# Patient Record
Sex: FEMALE | Race: OTHER | Employment: FULL TIME | URBAN - NONMETROPOLITAN AREA
[De-identification: names, ages, dates, MRNs, and addresses within clinical notes are randomized per-mention and may not be internally consistent; named-entity substitution may affect disease eponyms.]

---

## 2017-08-14 ENCOUNTER — OFFICE VISIT (OUTPATIENT)
Dept: PULMONOLOGY | Age: 32
End: 2017-08-14
Payer: COMMERCIAL

## 2017-08-14 VITALS
TEMPERATURE: 100 F | SYSTOLIC BLOOD PRESSURE: 114 MMHG | OXYGEN SATURATION: 97 % | WEIGHT: 260.4 LBS | DIASTOLIC BLOOD PRESSURE: 70 MMHG | BODY MASS INDEX: 49.17 KG/M2 | HEIGHT: 61 IN | HEART RATE: 94 BPM

## 2017-08-14 DIAGNOSIS — J45.40 MODERATE PERSISTENT ASTHMA WITHOUT COMPLICATION: ICD-10-CM

## 2017-08-14 DIAGNOSIS — J06.9 UPPER RESPIRATORY TRACT INFECTION, UNSPECIFIED TYPE: Primary | ICD-10-CM

## 2017-08-14 PROCEDURE — 99214 OFFICE O/P EST MOD 30 MIN: CPT | Performed by: INTERNAL MEDICINE

## 2017-08-14 RX ORDER — ALBUTEROL SULFATE 90 UG/1
2 AEROSOL, METERED RESPIRATORY (INHALATION) EVERY 6 HOURS PRN
Qty: 1 INHALER | Refills: 7 | Status: SHIPPED | OUTPATIENT
Start: 2017-08-14 | End: 2018-08-20 | Stop reason: ALTCHOICE

## 2017-08-14 RX ORDER — AZITHROMYCIN 250 MG/1
TABLET, FILM COATED ORAL
Qty: 1 PACKET | Refills: 0 | Status: SHIPPED | OUTPATIENT
Start: 2017-08-14 | End: 2017-08-24

## 2017-08-14 RX ORDER — ALBUTEROL SULFATE 2.5 MG/3ML
2.5 SOLUTION RESPIRATORY (INHALATION) EVERY 6 HOURS PRN
Qty: 120 EACH | Refills: 7 | Status: SHIPPED | OUTPATIENT
Start: 2017-08-14 | End: 2022-08-15 | Stop reason: SDUPTHER

## 2017-08-14 RX ORDER — BUDESONIDE AND FORMOTEROL FUMARATE DIHYDRATE 160; 4.5 UG/1; UG/1
2 AEROSOL RESPIRATORY (INHALATION) 2 TIMES DAILY
Qty: 1 INHALER | Refills: 11 | Status: SHIPPED | OUTPATIENT
Start: 2017-08-14 | End: 2018-08-15 | Stop reason: SDUPTHER

## 2017-08-14 RX ORDER — MONTELUKAST SODIUM 10 MG/1
10 TABLET ORAL NIGHTLY
Qty: 30 TABLET | Refills: 11 | Status: SHIPPED | OUTPATIENT
Start: 2017-08-14 | End: 2018-08-20 | Stop reason: SDUPTHER

## 2018-08-06 ENCOUNTER — HOSPITAL ENCOUNTER (OUTPATIENT)
Dept: PULMONOLOGY | Age: 33
Discharge: HOME | End: 2018-08-06
Payer: COMMERCIAL

## 2018-08-06 DIAGNOSIS — J45.40 MODERATE PERSISTENT ASTHMA WITHOUT COMPLICATION: ICD-10-CM

## 2018-08-06 DIAGNOSIS — J06.9 UPPER RESPIRATORY TRACT INFECTION, UNSPECIFIED TYPE: ICD-10-CM

## 2018-08-06 PROCEDURE — 94060 EVALUATION OF WHEEZING: CPT

## 2018-08-20 ENCOUNTER — OFFICE VISIT (OUTPATIENT)
Dept: PULMONOLOGY | Age: 33
End: 2018-08-20
Payer: COMMERCIAL

## 2018-08-20 VITALS
BODY MASS INDEX: 50.26 KG/M2 | HEART RATE: 80 BPM | WEIGHT: 266.2 LBS | TEMPERATURE: 98.6 F | HEIGHT: 61 IN | DIASTOLIC BLOOD PRESSURE: 60 MMHG | OXYGEN SATURATION: 96 % | SYSTOLIC BLOOD PRESSURE: 124 MMHG

## 2018-08-20 DIAGNOSIS — E66.01 MORBID OBESITY WITH BMI OF 50.0-59.9, ADULT (HCC): Primary | ICD-10-CM

## 2018-08-20 DIAGNOSIS — J45.40 MODERATE PERSISTENT ASTHMA WITHOUT COMPLICATION: ICD-10-CM

## 2018-08-20 DIAGNOSIS — F32.A DEPRESSION, UNSPECIFIED DEPRESSION TYPE: ICD-10-CM

## 2018-08-20 PROCEDURE — 99213 OFFICE O/P EST LOW 20 MIN: CPT | Performed by: NURSE PRACTITIONER

## 2018-08-20 RX ORDER — MONTELUKAST SODIUM 10 MG/1
10 TABLET ORAL NIGHTLY
Qty: 30 TABLET | Refills: 11 | Status: SHIPPED | OUTPATIENT
Start: 2018-08-20 | End: 2019-08-27 | Stop reason: SDUPTHER

## 2018-08-20 RX ORDER — ALBUTEROL SULFATE 90 UG/1
2 AEROSOL, METERED RESPIRATORY (INHALATION) EVERY 6 HOURS PRN
Qty: 1 INHALER | Refills: 11 | Status: SHIPPED | OUTPATIENT
Start: 2018-08-20 | End: 2020-04-13

## 2018-08-20 RX ORDER — ALBUTEROL SULFATE 90 UG/1
2 AEROSOL, METERED RESPIRATORY (INHALATION) EVERY 6 HOURS PRN
COMMUNITY
End: 2018-08-20 | Stop reason: SDUPTHER

## 2018-08-20 ASSESSMENT — ENCOUNTER SYMPTOMS
VOMITING: 0
NAUSEA: 0
WHEEZING: 0
DIARRHEA: 0
SHORTNESS OF BREATH: 0
EYES NEGATIVE: 1
HEMOPTYSIS: 0
COUGH: 0
ABDOMINAL PAIN: 0
SPUTUM PRODUCTION: 0

## 2019-08-12 DIAGNOSIS — J06.9 UPPER RESPIRATORY TRACT INFECTION, UNSPECIFIED TYPE: ICD-10-CM

## 2019-08-12 DIAGNOSIS — J45.40 MODERATE PERSISTENT ASTHMA WITHOUT COMPLICATION: ICD-10-CM

## 2019-08-13 RX ORDER — BUDESONIDE AND FORMOTEROL FUMARATE DIHYDRATE 160; 4.5 UG/1; UG/1
AEROSOL RESPIRATORY (INHALATION)
Qty: 10.2 G | Refills: 2 | Status: SHIPPED | OUTPATIENT
Start: 2019-08-13 | End: 2019-08-20 | Stop reason: ALTCHOICE

## 2019-08-20 ENCOUNTER — OFFICE VISIT (OUTPATIENT)
Dept: PULMONOLOGY | Age: 34
End: 2019-08-20
Payer: COMMERCIAL

## 2019-08-20 VITALS
HEART RATE: 84 BPM | WEIGHT: 250 LBS | BODY MASS INDEX: 47.2 KG/M2 | SYSTOLIC BLOOD PRESSURE: 126 MMHG | DIASTOLIC BLOOD PRESSURE: 70 MMHG | OXYGEN SATURATION: 97 % | HEIGHT: 61 IN | TEMPERATURE: 98.4 F

## 2019-08-20 DIAGNOSIS — E66.01 MORBID OBESITY WITH BMI OF 50.0-59.9, ADULT (HCC): ICD-10-CM

## 2019-08-20 PROCEDURE — 99213 OFFICE O/P EST LOW 20 MIN: CPT | Performed by: NURSE PRACTITIONER

## 2019-08-20 ASSESSMENT — ENCOUNTER SYMPTOMS
COUGH: 0
DIARRHEA: 0
SHORTNESS OF BREATH: 0
EYES NEGATIVE: 1
NAUSEA: 0
WHEEZING: 0
VOMITING: 0
ABDOMINAL PAIN: 0

## 2019-08-27 ENCOUNTER — TELEPHONE (OUTPATIENT)
Dept: PULMONOLOGY | Age: 34
End: 2019-08-27

## 2019-08-27 DIAGNOSIS — J45.40 MODERATE PERSISTENT ASTHMA WITHOUT COMPLICATION: ICD-10-CM

## 2019-08-27 RX ORDER — MONTELUKAST SODIUM 10 MG/1
10 TABLET ORAL NIGHTLY
Qty: 30 TABLET | Refills: 11 | Status: SHIPPED | OUTPATIENT
Start: 2019-08-27 | End: 2020-10-01 | Stop reason: SDUPTHER

## 2019-12-09 ENCOUNTER — TELEPHONE (OUTPATIENT)
Dept: PULMONOLOGY | Age: 34
End: 2019-12-09

## 2020-01-14 ENCOUNTER — OFFICE VISIT (OUTPATIENT)
Dept: PULMONOLOGY | Age: 35
End: 2020-01-14
Payer: COMMERCIAL

## 2020-01-14 VITALS
OXYGEN SATURATION: 98 % | TEMPERATURE: 98.2 F | DIASTOLIC BLOOD PRESSURE: 78 MMHG | HEIGHT: 61 IN | BODY MASS INDEX: 48.52 KG/M2 | HEART RATE: 106 BPM | WEIGHT: 257 LBS | SYSTOLIC BLOOD PRESSURE: 124 MMHG

## 2020-01-14 PROCEDURE — 95012 NITRIC OXIDE EXP GAS DETER: CPT | Performed by: NURSE PRACTITIONER

## 2020-01-14 PROCEDURE — 99213 OFFICE O/P EST LOW 20 MIN: CPT | Performed by: NURSE PRACTITIONER

## 2020-01-14 ASSESSMENT — ENCOUNTER SYMPTOMS
EYES NEGATIVE: 1
CHEST TIGHTNESS: 0
WHEEZING: 0
NAUSEA: 0
SHORTNESS OF BREATH: 0
ALLERGIC/IMMUNOLOGIC NEGATIVE: 1
COUGH: 0
DIARRHEA: 0
STRIDOR: 0
BACK PAIN: 0

## 2020-01-14 NOTE — PROGRESS NOTES
[facility]     Patient: Rebekah Smith, 30 y.o.   : 1985  2020    Pt of Dr. Justin Hightower   Patient presents with    Follow-up     Asthma 3 month follow up with no testing        HPI  Marissa is here for 3 month med follow up for her asthma   Last visit we de-escalated her therapy from ICS/ LABA to single ICS. Insurance would not cover Pulmicort so she is taking Qvar 80 mcg   Has little cough and nasal congestion x 2 weeks, denies any wheezing, chest tightness, increased SOB or fevers  Not taking any OTC meds for her cold. Has lost about 10 pds in past 1 year   Past Medical hx   PMH:  Past Medical History:   Diagnosis Date    Asthma     Depression     Difficult intubation 2016    with [facility]    Thyroid disease      SURGICAL HISTORY:  Past Surgical History:   Procedure Laterality Date     SECTION  2016    IGS ENDO SINUS SX Bilateral 10/04/2016    maxillary sinusotomy, bilateral ethmoidectomy, sptoplasty, bilateral turbinoplasties--Dr Go    TONSILLECTOMY       SOCIAL HISTORY:  Social History     Tobacco Use    Smoking status: Never Smoker    Smokeless tobacco: Never Used   Substance Use Topics    Alcohol use: No     Alcohol/week: 0.0 standard drinks    Drug use: No     ALLERGIES:  Allergies   Allergen Reactions    Clindamycin/Lincomycin Hives     FAMILY HISTORY:No family history on file.   CURRENT MEDICATIONS:  Current Outpatient Medications   Medication Sig Dispense Refill    beclomethasone (QVAR REDIHALER) 80 MCG/ACT AERB inhaler Inhale 2 puffs into the lungs 2 times daily 1 Inhaler 11    montelukast (SINGULAIR) 10 MG tablet Take 1 tablet by mouth nightly 30 tablet 11    albuterol sulfate HFA (PROAIR HFA) 108 (90 Base) MCG/ACT inhaler Inhale 2 puffs into the lungs every 6 hours as needed for Wheezing 1 Inhaler 11    albuterol (PROVENTIL) (2.5 MG/3ML) 0.083% nebulizer solution Take 3 mLs by nebulization every 6 hours as needed for Wheezing 120 each 7    sertraline (ZOLOFT) 25 MG tablet Take 100 mg by mouth daily        No current facility-administered medications for this visit. Mateo MERCADO   Review of Systems   Constitutional: Negative for activity change, appetite change, chills and fever. HENT: Negative for congestion and postnasal drip. Eyes: Negative. Respiratory: Negative for cough, chest tightness, shortness of breath, wheezing and stridor. Cardiovascular: Negative for chest pain and leg swelling. Gastrointestinal: Negative for diarrhea and nausea. Endocrine: Negative. Genitourinary: Negative. Musculoskeletal: Negative. Negative for arthralgias and back pain. Skin: Negative. Allergic/Immunologic: Negative. Neurological: Negative. Negative for dizziness and light-headedness. Psychiatric/Behavioral: Negative. All other systems reviewed and are negative. Physical exam   /78 (Site: Right Upper Arm, Position: Sitting, Cuff Size: Large Adult)   Pulse 106   Temp 98.2 °F (36.8 °C)   Ht 5' 1\" (1.549 m)   Wt 257 lb (116.6 kg)   SpO2 98% Comment: on room air at rest  BMI 48.56 kg/m²        Physical Exam  Vitals signs and nursing note reviewed. Constitutional:       General: She is not in acute distress. Appearance: She is well-developed. She is obese. HENT:      Mouth/Throat:      Mouth: Mucous membranes are moist.      Pharynx: Oropharynx is clear. No oropharyngeal exudate. Eyes:      General: No scleral icterus. Right eye: No discharge. Conjunctiva/sclera: Conjunctivae normal.   Neck:      Musculoskeletal: Neck supple. Vascular: No JVD. Cardiovascular:      Rate and Rhythm: Normal rate and regular rhythm. Heart sounds: No murmur. No friction rub. Pulmonary:      Effort: Pulmonary effort is normal. No respiratory distress. Breath sounds: Normal breath sounds. No wheezing or rales.

## 2020-04-13 RX ORDER — ALBUTEROL SULFATE 90 UG/1
AEROSOL, METERED RESPIRATORY (INHALATION)
Qty: 25.5 G | Refills: 4 | Status: SHIPPED | OUTPATIENT
Start: 2020-04-13 | End: 2020-10-07 | Stop reason: SDUPTHER

## 2020-09-29 NOTE — TELEPHONE ENCOUNTER
CLINICAL PHARMACY CONSULT: ASTHMA INHALER REVIEW    Natali Constantino is a 37 y.o. female Identified as an Asthma care gap for [place]: high medication possession ratio of rescue to maintenance inhalers. Per [employer] claims Asthma medication ratio 0.45    Per [facility], montelukast script on file is . No active refills remaining. Attempt made to contact pt. Unable to leave message as voicemail box full. Will continue to attempt to contact pt as appropriate.     Jolly Sanchez, PharmD, [address], [facility]  [facility] Pharmacist  O: [phone]  Department, toll free: [phone], option 7

## 2020-09-30 NOTE — TELEPHONE ENCOUNTER
Li Soto CNP,    Per [facility] pharmacy, [facility] script on file is . No active refills remaining. I have pended prescription refill for your convenience if you agree. Thank you,  Katelin Junior, PharmD, [place], [address] [place] [address] Road: [phone]  Department, toll free: [phone], option 7   =======================================================    Second attempt made to contact pt. Unable to leave message as voicemail box full. MyChart message sent.

## 2020-10-01 RX ORDER — MONTELUKAST SODIUM 10 MG/1
10 TABLET ORAL NIGHTLY
Qty: 30 TABLET | Refills: 11 | Status: SHIPPED | OUTPATIENT
Start: 2020-10-01 | End: 2021-11-02 | Stop reason: SDUPTHER

## 2020-10-01 NOTE — TELEPHONE ENCOUNTER
Thank you, note refill sent!     CLINICAL PHARMACY CONSULT: MED RECONCILIATION/REVIEW ADDENDUM  For Pharmacy Admin Tracking Only  PHSO: Yes  Total # of Interventions Recommended: 1  - Refills Provided #: 1  - Maintenance Safety Lab Monitoring #: 1  Recommended intervention potential cost savings: 0  Total Interventions Accepted: 1  Time Spent (min): Adore Hummel 930, PharmD  [address]

## 2020-10-07 RX ORDER — ALBUTEROL SULFATE 90 UG/1
AEROSOL, METERED RESPIRATORY (INHALATION)
Qty: 25.5 G | Refills: 4 | Status: SHIPPED | OUTPATIENT
Start: 2020-10-07 | End: 2021-04-02 | Stop reason: SDUPTHER

## 2020-10-07 NOTE — TELEPHONE ENCOUNTER
[facility] called requesting a refill on the following medications:  Requested Prescriptions     Pending Prescriptions Disp Refills    albuterol sulfate  (90 Base) MCG/ACT inhaler 25.5 g 4     Pharmacy verified: [facility]  .       Date of last visit: 1/14/2020  Date of next visit (if applicable): Visit date not found    Pharmacy needs a new prescription as she switched pharmacies

## 2020-12-29 ENCOUNTER — TELEPHONE (OUTPATIENT)
Dept: PULMONOLOGY | Age: 35
End: 2020-12-29

## 2021-04-02 RX ORDER — ALBUTEROL SULFATE 90 UG/1
AEROSOL, METERED RESPIRATORY (INHALATION)
Qty: 25.5 G | Refills: 4 | Status: SHIPPED | OUTPATIENT
Start: 2021-04-02 | End: 2022-04-19

## 2021-04-02 NOTE — TELEPHONE ENCOUNTER
[facility] called requesting a refill on the following medications:  Requested Prescriptions     Pending Prescriptions Disp Refills    albuterol sulfate  (90 Base) MCG/ACT inhaler 25.5 g 4     Sig: USE 2 INHALATIONS EVERY 6  HOURS AS NEEDED FOR        [address] verified: [facility], [place]    Date of last visit: 1/14/20  Date of next visit (if applicable): 7/60/8210

## 2021-04-28 ENCOUNTER — HOSPITAL ENCOUNTER (OUTPATIENT)
Age: 36
Discharge: HOME | End: 2021-04-28
Payer: COMMERCIAL

## 2021-04-28 PROCEDURE — U0005 INFEC AGEN DETEC AMPLI PROBE: HCPCS

## 2021-04-28 PROCEDURE — U0003 INFECTIOUS AGENT DETECTION BY NUCLEIC ACID (DNA OR RNA); SEVERE ACUTE RESPIRATORY SYNDROME CORONAVIRUS 2 (SARS-COV-2) (CORONAVIRUS DISEASE [COVID-19]), AMPLIFIED PROBE TECHNIQUE, MAKING USE OF HIGH THROUGHPUT TECHNOLOGIES AS DESCRIBED BY CMS-2020-01-R: HCPCS

## 2021-04-30 ENCOUNTER — TELEPHONE (OUTPATIENT)
Dept: PULMONOLOGY | Age: 36
End: 2021-04-30

## 2021-04-30 DIAGNOSIS — J45.40 MODERATE PERSISTENT ASTHMA WITHOUT COMPLICATION: Primary | ICD-10-CM

## 2021-04-30 LAB
SARS-COV-2: NOT DETECTED
SOURCE: NORMAL

## 2021-05-03 ENCOUNTER — HOSPITAL ENCOUNTER (OUTPATIENT)
Dept: PULMONOLOGY | Age: 36
Discharge: HOME | End: 2021-05-03
Payer: COMMERCIAL

## 2021-05-03 ENCOUNTER — TELEPHONE (OUTPATIENT)
Dept: PULMONOLOGY | Age: 36
End: 2021-05-03

## 2021-05-03 DIAGNOSIS — J45.40 MODERATE PERSISTENT ASTHMA WITHOUT COMPLICATION: Primary | ICD-10-CM

## 2021-05-03 DIAGNOSIS — J45.40 MODERATE PERSISTENT ASTHMA WITHOUT COMPLICATION: ICD-10-CM

## 2021-05-03 PROCEDURE — 94726 PLETHYSMOGRAPHY LUNG VOLUMES: CPT

## 2021-05-03 PROCEDURE — 94729 DIFFUSING CAPACITY: CPT

## 2021-05-03 PROCEDURE — 94060 EVALUATION OF WHEEZING: CPT

## 2021-05-20 ENCOUNTER — OFFICE VISIT (OUTPATIENT)
Dept: PULMONOLOGY | Age: 36
End: 2021-05-20
Payer: COMMERCIAL

## 2021-05-20 VITALS
TEMPERATURE: 97.8 F | BODY MASS INDEX: 51.54 KG/M2 | OXYGEN SATURATION: 98 % | DIASTOLIC BLOOD PRESSURE: 78 MMHG | WEIGHT: 273 LBS | HEIGHT: 61 IN | SYSTOLIC BLOOD PRESSURE: 122 MMHG | HEART RATE: 90 BPM

## 2021-05-20 DIAGNOSIS — Z91.09 ENVIRONMENTAL ALLERGIES: ICD-10-CM

## 2021-05-20 DIAGNOSIS — J45.40 MODERATE PERSISTENT ASTHMA WITHOUT COMPLICATION: Primary | ICD-10-CM

## 2021-05-20 DIAGNOSIS — E66.01 MORBID OBESITY WITH BMI OF 50.0-59.9, ADULT (HCC): ICD-10-CM

## 2021-05-20 PROCEDURE — 99214 OFFICE O/P EST MOD 30 MIN: CPT | Performed by: NURSE PRACTITIONER

## 2021-05-20 PROCEDURE — 1036F TOBACCO NON-USER: CPT | Performed by: NURSE PRACTITIONER

## 2021-05-20 PROCEDURE — G8427 DOCREV CUR MEDS BY ELIG CLIN: HCPCS | Performed by: NURSE PRACTITIONER

## 2021-05-20 PROCEDURE — G8417 CALC BMI ABV UP PARAM F/U: HCPCS | Performed by: NURSE PRACTITIONER

## 2021-05-20 RX ORDER — FLUTICASONE PROPIONATE 110 UG/1
2 AEROSOL, METERED RESPIRATORY (INHALATION) 2 TIMES DAILY
Qty: 1 INHALER | Refills: 11 | Status: SHIPPED | OUTPATIENT
Start: 2021-05-20 | End: 2022-05-09

## 2021-05-20 ASSESSMENT — ENCOUNTER SYMPTOMS
SORE THROAT: 0
NAUSEA: 0
RHINORRHEA: 1
WHEEZING: 0
EYE DISCHARGE: 1
SHORTNESS OF BREATH: 0
CHEST TIGHTNESS: 1
COUGH: 1
DIARRHEA: 0
VOMITING: 0
ABDOMINAL PAIN: 0

## 2021-05-20 NOTE — PROGRESS NOTES
[place] [facility]     Patient: Sergey Guerra, 31 y.o.   : 1985  2021    Pt of Dr. Suhail Perez   Patient presents with    Follow-up     Asthma 16 month pulmonary  follow up with PFT 5/3/2021        HPI  Priyanka Hernandez is here for 1year follow up for Asthma    Exercising, \"power walking about 1 mile a day\" trying to loose weight  Cough that started 1 month ago due to allergies  Takes OTC Zyrtec and Singulair as prescribed with benefit  No ER visits  No exacerbations  Denies nocturnal symptoms  Using Qvar as instructed, but reports nausea/ gagging with every use and sometimes vomits from the gagging. States the current inhaler is a resplick device, has never had trouble with sprays , and has no trouble with albuterol   Is seldomly requiring albuterol   No wheezing       SOCIAL HISTORY:  Social History     Tobacco Use    Smoking status: Never Smoker    Smokeless tobacco: Never Used   Substance Use Topics    Alcohol use: No     Alcohol/week: 0.0 standard drinks    Drug use: No         CURRENT MEDICATIONS:  Current Outpatient Medications   Medication Sig Dispense Refill    fluticasone (FLOVENT HFA) 110 MCG/ACT inhaler Inhale 2 puffs into the lungs 2 times daily Rinse mouth after its use. Use with spacer 1 Inhaler 11    albuterol sulfate  (90 Base) MCG/ACT inhaler USE 2 INHALATIONS EVERY 6  HOURS AS NEEDED FOR        SHORTNESS OF BREATH 25.5 g 4    montelukast (SINGULAIR) 10 MG tablet Take 1 tablet by mouth nightly 30 tablet 11    albuterol (PROVENTIL) (2.5 MG/3ML) 0.083% nebulizer solution Take 3 mLs by nebulization every 6 hours as needed for Wheezing 120 each 7    sertraline (ZOLOFT) 25 MG tablet Take 100 mg by mouth daily        No current facility-administered medications for this visit. [facility] Records ROS   Review of Systems   Constitutional: Negative for chills and fever. HENT: Positive for congestion, postnasal drip and rhinorrhea. Nails: There is no clubbing. Neurological:      Mental Status: She is alert. Psychiatric:         Mood and Affect: Mood normal.         Behavior: Behavior normal.         Thought Content: Thought content normal.         Judgment: Judgment normal.          Test results   Lung Nodule Screening     [] Qualifies    [x]Does not qualify   [] Declined    [] Completed           Assessment      Diagnosis Orders   1. Moderate persistent asthma without complication  fluticasone (FLOVENT HFA) 110 MCG/ACT inhaler   2. Environmental allergies     3. Morbid obesity with BMI of 50.0-59.9, adult (HCC)       Plan    Stable PFT, does show mild air trapping of unclear significance, will continue to monitor   Change Qvar to FLovent HFA , use with spacer.  Continue to rinse well after use  Counseled on weight loss  Continue Singulair daily   PRN Zyrtec     Billed based on medical decision making   Will see Nadira Carrasquillo in: 1 year    Electronically signed by YANG Puente CNP on 5/20/2021 at 10:23 AM

## 2021-11-02 DIAGNOSIS — J45.40 MODERATE PERSISTENT ASTHMA WITHOUT COMPLICATION: ICD-10-CM

## 2021-11-02 RX ORDER — MONTELUKAST SODIUM 10 MG/1
10 TABLET ORAL NIGHTLY
Qty: 30 TABLET | Refills: 11 | Status: SHIPPED | OUTPATIENT
Start: 2021-11-02 | End: 2022-05-23 | Stop reason: SDUPTHER

## 2022-04-19 RX ORDER — ALBUTEROL SULFATE 90 UG/1
AEROSOL, METERED RESPIRATORY (INHALATION)
Qty: 25.5 G | Refills: 4 | OUTPATIENT
Start: 2022-04-19

## 2022-04-19 NOTE — TELEPHONE ENCOUNTER
[facility] called requesting a refill on the following medications:  Requested Prescriptions     Pending Prescriptions Disp Refills    albuterol sulfate  (90 Base) MCG/ACT inhaler 25.5 g 4     Sig: USE 2 INHALATIONS EVERY 6  HOURS AS NEEDED FOR        [address] verified: [employer] in [facility]  .       Date of last visit: 5/20/2021  Date of next visit (if applicable): 3/51/9491

## 2022-05-09 DIAGNOSIS — J45.40 MODERATE PERSISTENT ASTHMA WITHOUT COMPLICATION: ICD-10-CM

## 2022-05-09 RX ORDER — DEXAMETHASONE 4 MG/1
TABLET ORAL
Qty: 12 G | Refills: 0 | Status: SHIPPED | OUTPATIENT
Start: 2022-05-09 | End: 2022-08-15 | Stop reason: SDUPTHER

## 2022-05-09 NOTE — TELEPHONE ENCOUNTER
Received refill request for Flovent . Medication was last ordered by Marie. Medication was last ordered on 5/20/2021 with 11 refills. Patient was last seen in the office 5/20/2021. Patient has a scheduled follow up 5/23/2022.

## 2022-05-20 ENCOUNTER — TELEPHONE (OUTPATIENT)
Dept: PULMONOLOGY | Age: 37
End: 2022-05-20

## 2022-05-20 DIAGNOSIS — J45.40 MODERATE PERSISTENT ASTHMA WITHOUT COMPLICATION: ICD-10-CM

## 2022-05-20 NOTE — TELEPHONE ENCOUNTER
Called pt to move appt out due to cary being out she stated she needed refills on the following Received refill request for Proair. Medication was last ordered by Luther Drew. Medication was last ordered on 4/19/22 with 1 refills. Patient was last seen in the office 5/20/21. Patient has a scheduled follow up 8/2/22. Medication needs to be sent to [facility].    And Singular last refilled 11/2/21

## 2022-05-23 RX ORDER — MONTELUKAST SODIUM 10 MG/1
10 TABLET ORAL NIGHTLY
Qty: 30 TABLET | Refills: 11 | Status: SHIPPED | OUTPATIENT
Start: 2022-05-23

## 2022-08-01 DIAGNOSIS — J45.40 MODERATE PERSISTENT ASTHMA WITHOUT COMPLICATION: ICD-10-CM

## 2022-08-01 DIAGNOSIS — J06.9 UPPER RESPIRATORY TRACT INFECTION, UNSPECIFIED TYPE: ICD-10-CM

## 2022-08-01 NOTE — TELEPHONE ENCOUNTER
[facility] called requesting a refill on the following medications:  Requested Prescriptions     Pending Prescriptions Disp Refills    fluticasone (FLOVENT HFA) 110 MCG/ACT inhaler 12 g 0    albuterol (PROVENTIL) (2.5 MG/3ML) 0.083% nebulizer solution 120 each 7     Sig: Take 3 mLs by nebulization every 6 hours as needed for Wheezing     Pharmacy verified: [facility] in [facility]  . pv      Date of last visit: 5/20/2021  Date of next visit (if applicable): Visit date not found

## 2022-08-02 NOTE — TELEPHONE ENCOUNTER
Received refill request for Flovent / Proventil. Medication was last ordered by Marianne Borges. Medication was last ordered on 5/9/22, 8/14/17. Patient was last seen in the office 5/20/21, canceled 5/23/22, and 8/2/22 notes that pt tested + for COVID. Medication needs to be sent to [facility].

## 2022-08-03 RX ORDER — ALBUTEROL SULFATE 2.5 MG/3ML
2.5 SOLUTION RESPIRATORY (INHALATION) EVERY 6 HOURS PRN
Qty: 120 EACH | Refills: 7 | OUTPATIENT
Start: 2022-08-03

## 2022-08-03 RX ORDER — FLUTICASONE PROPIONATE 110 UG/1
AEROSOL, METERED RESPIRATORY (INHALATION)
Qty: 12 G | Refills: 0 | OUTPATIENT
Start: 2022-08-03

## 2022-08-12 NOTE — TELEPHONE ENCOUNTER
Patient calling in to check on this, she does have an appt scheduled for 10/17/2022. Please call her back to discuss, and if needs seen sooner please advise when.

## 2022-08-15 RX ORDER — ALBUTEROL SULFATE 2.5 MG/3ML
2.5 SOLUTION RESPIRATORY (INHALATION) EVERY 6 HOURS PRN
Qty: 120 EACH | Refills: 2 | Status: SHIPPED | OUTPATIENT
Start: 2022-08-15

## 2022-08-15 RX ORDER — FLUTICASONE PROPIONATE 110 UG/1
AEROSOL, METERED RESPIRATORY (INHALATION)
Qty: 12 G | Refills: 2 | Status: SHIPPED | OUTPATIENT
Start: 2022-08-15 | End: 2022-10-04 | Stop reason: SDUPTHER

## 2022-08-15 NOTE — TELEPHONE ENCOUNTER
I have approved for 3 months of medication. She has cancelled 2 appts now back to back and has not been seen in over 1 year. She must keep scheduled appt in oct , or will need to get refills from PCP until seen .

## 2022-10-04 ENCOUNTER — OFFICE VISIT (OUTPATIENT)
Dept: PULMONOLOGY | Age: 37
End: 2022-10-04
Payer: COMMERCIAL

## 2022-10-04 VITALS
WEIGHT: 276.2 LBS | HEIGHT: 63 IN | OXYGEN SATURATION: 99 % | BODY MASS INDEX: 48.94 KG/M2 | DIASTOLIC BLOOD PRESSURE: 80 MMHG | HEART RATE: 81 BPM | SYSTOLIC BLOOD PRESSURE: 130 MMHG | TEMPERATURE: 98.1 F

## 2022-10-04 DIAGNOSIS — J45.40 MODERATE PERSISTENT ASTHMA WITHOUT COMPLICATION: ICD-10-CM

## 2022-10-04 DIAGNOSIS — J45.41 MODERATE PERSISTENT ASTHMA WITH EXACERBATION: Primary | ICD-10-CM

## 2022-10-04 DIAGNOSIS — Z86.16 HISTORY OF 2019 NOVEL CORONAVIRUS DISEASE (COVID-19): ICD-10-CM

## 2022-10-04 DIAGNOSIS — E66.01 MORBID OBESITY WITH BMI OF 50.0-59.9, ADULT (HCC): ICD-10-CM

## 2022-10-04 PROCEDURE — 1036F TOBACCO NON-USER: CPT | Performed by: NURSE PRACTITIONER

## 2022-10-04 PROCEDURE — G8427 DOCREV CUR MEDS BY ELIG CLIN: HCPCS | Performed by: NURSE PRACTITIONER

## 2022-10-04 PROCEDURE — G8484 FLU IMMUNIZE NO ADMIN: HCPCS | Performed by: NURSE PRACTITIONER

## 2022-10-04 PROCEDURE — G8417 CALC BMI ABV UP PARAM F/U: HCPCS | Performed by: NURSE PRACTITIONER

## 2022-10-04 PROCEDURE — 99214 OFFICE O/P EST MOD 30 MIN: CPT | Performed by: NURSE PRACTITIONER

## 2022-10-04 RX ORDER — FLUTICASONE PROPIONATE 110 UG/1
AEROSOL, METERED RESPIRATORY (INHALATION)
Qty: 12 G | Refills: 2 | Status: SHIPPED | OUTPATIENT
Start: 2022-10-04

## 2022-10-04 RX ORDER — PREDNISONE 20 MG/1
40 TABLET ORAL DAILY
Qty: 10 TABLET | Refills: 0 | Status: SHIPPED | OUTPATIENT
Start: 2022-10-04 | End: 2022-10-09

## 2022-10-04 ASSESSMENT — ENCOUNTER SYMPTOMS
DIARRHEA: 0
ABDOMINAL PAIN: 0
COUGH: 1
SHORTNESS OF BREATH: 0
WHEEZING: 1
EYES NEGATIVE: 1
NAUSEA: 0
VOMITING: 0

## 2022-10-04 NOTE — PROGRESS NOTES
[facility]     Patient: Tyra Orozco, 30 y.o.   : 1985  10/4/2022    Pt of Dr. Kyela Bowman   Patient presents with    Follow-up     1 year asthma follow up, no testing. HPI  Nikhil Vargas is here for 1 year follow up for Asthma    Current symptoms:   Patient is experiencing SOB: \"no\" but chest tightness with breathing since covid 2022  Patient is experiencing wheezing: No  Patient states they have had a productive cough. Phlegm is daily, in morning, light green in color, new since COVID . Patient is coughing up blood: no  Patient has been experiencing chest pains: non-existent  Patient is currently taking the following inhaler(s): Flovent and Proair, Singulair PO nightly  PRN Zyrtec OTC - not currently taking   Patient is currently taking the following nebulizer treatment(s): Albuterol used 2022 when pt had covid- mild symptoms; was prescribed Paxlovid   Patient is using their rescue inhaler \"every 4 hours or so since covid\" gets benefit with use of albuterol   Patient needs refills of the following medications: Proair and Flovent     All refills should be sent to [facility] in [facility]  Other: family history, pt's mother passed away.      Last spirometry: - normal spirometry, + air trapping     SOCIAL HISTORY:  Social History     Tobacco Use    Smoking status: Never    Smokeless tobacco: Never   Substance Use Topics    Alcohol use: No     Alcohol/week: 0.0 standard drinks    Drug use: No         CURRENT MEDICATIONS:  Current Outpatient Medications   Medication Sig Dispense Refill    predniSONE (DELTASONE) 20 MG tablet Take 2 tablets by mouth daily for 5 days 10 tablet 0    fluticasone (FLOVENT HFA) 110 MCG/ACT inhaler inhale 2 puffs by mouth twice a day and INTO THE LUNGS Rinse mouth after use ---USE WITH SPACER 12 g 2    albuterol (PROVENTIL) (2.5 MG/3ML) 0.083% nebulizer solution Take 3 mLs by nebulization every 6 hours as needed for Wheezing 120 each 2    montelukast (SINGULAIR) 10 MG tablet Take 1 tablet by mouth nightly 30 tablet 11    PROAIR  (90 Base) MCG/ACT inhaler inhale 2 puffs by mouth every 6 hours if needed for shortness of breath 25.5 g 1    sertraline (ZOLOFT) 25 MG tablet Take 100 mg by mouth daily        No current facility-administered medications for this visit. Yulia MERCADO   Review of Systems   Constitutional:  Negative for activity change, appetite change, chills, fatigue, fever and unexpected weight change. HENT: Negative. Eyes: Negative. Respiratory:  Positive for cough and wheezing. Negative for shortness of breath. Cardiovascular:  Negative for chest pain, palpitations and leg swelling. Gastrointestinal:  Negative for abdominal pain, diarrhea, nausea and vomiting. Genitourinary: Negative. Musculoskeletal: Negative. Skin: Negative. Neurological: Negative. Hematological: Negative. Psychiatric/Behavioral: Negative. Physical exam   /80 (Site: Left Upper Arm, Position: Sitting, Cuff Size: Medium Adult)   Pulse 81   Temp 98.1 °F (36.7 °C) (Oral)   Ht 5' 3\" (1.6 m)   Wt 276 lb 3.2 oz (125.3 kg)   SpO2 99%   BMI 48.93 kg/m²       Wt Readings from Last 3 Encounters:   10/04/22 276 lb 3.2 oz (125.3 kg)   05/20/21 273 lb (123.8 kg)   01/14/20 257 lb (116.6 kg)     Physical Exam  Vitals and nursing note reviewed. Constitutional:       General: She is not in acute distress. Appearance: She is well-developed and overweight. HENT:      Nose: No congestion or rhinorrhea. Mouth/Throat:      Lips: Pink. Mouth: Mucous membranes are moist.      Pharynx: Oropharynx is clear. No oropharyngeal exudate or posterior oropharyngeal erythema. Eyes:      Conjunctiva/sclera: Conjunctivae normal.   Neck:      Vascular: No JVD. Cardiovascular:      Rate and Rhythm: Normal rate and regular rhythm. Heart sounds: No murmur heard. No friction rub.    Pulmonary: Effort: Pulmonary effort is normal. No accessory muscle usage or respiratory distress. Breath sounds: Examination of the right-upper field reveals wheezing. Examination of the right-middle field reveals wheezing. Wheezing present. No rhonchi or rales. Chest:      Chest wall: No tenderness. Musculoskeletal:      Right lower leg: No edema. Left lower leg: No edema. Skin:     General: Skin is warm and dry. Capillary Refill: Capillary refill takes less than 2 seconds. Nails: There is no clubbing. Neurological:      Mental Status: She is alert and oriented to person, place, and time. Psychiatric:         Mood and Affect: Mood normal.         Behavior: Behavior normal.         Thought Content: Thought content normal.         Judgment: Judgment normal.        Test results   Lung Nodule Screening     [] Qualifies    [x]Does not qualify   [] Declined    [] Completed    7330     Assessment       ICD-10-CM    1. Moderate persistent asthma with exacerbation  J45.41       2. Moderate persistent asthma without complication  A41.40 fluticasone (FLOVENT HFA) 110 MCG/ACT inhaler      3. Morbid obesity with BMI of 50.0-59.9, adult [facility]  E66.01     Z68.43       4. History of 2019 novel coronavirus disease (COVID-19)  Z86.16           Current asthma exacerbation due to recent COVID 19 infection. Plan    -Asthma symptoms have been very well controlled until recent COVID-19 infection, now having persistent chest tightness and wheezing distant with exacerbation;    Rx for prednisone 40 mg p.o. daily x5 days  Continue current dosing of Flovent 100 mcg inhaler 2 puffs twice daily, rinse mouth well after use  Continue to utilize as needed albuterol sulfate  Continue Singulair 10 mg tablet p.o. nightly  As needed over-the-counter antihistamine for allergy symptoms  -avoid ill contacts  -Can hold on obesity and recommendation for weight reduction.  -keep UTD on recommended vaccinations    (Please note that portions of this note may have been with a voice recognition program.  Efforts were made to edit the dictation but occasionally words are mis-transcribed )     Call office if no improvement in symptoms    Will see Sal Hinton in: 1 year  billing based on medical decision making   Electronically signed by YANG Stauffer CNP on 10/4/2022 at 10:36 AM

## 2022-10-04 NOTE — PROGRESS NOTES
Patient is experiencing SOB: \"no\" but chest tightness with breathing since covid 8/2022    Patient is experiencing wheezing: No    Patient states they have had a Weak, productive = 2 cough. Phlegm is daily, in morning, light green in color. Patient is coughing up blood: no    Patient has been experiencing chest pains: non-existent    Patient is currently taking the following inhaler(s): Flovent and Proair    Patient is currently taking the following nebulizer treatment(s): Albuterol used 8/2022 when pt had covid    Patient is using their rescue inhaler \"every 4 hours or so since covid\"    Patient needs refills of the following medications: Proair and Flovent    All refills should be sent to [employer] in [facility]    Other: family history, pt's mother passed away.

## 2022-10-12 ENCOUNTER — TELEPHONE (OUTPATIENT)
Dept: PULMONOLOGY | Age: 37
End: 2022-10-12

## 2022-10-12 DIAGNOSIS — J45.41 MODERATE PERSISTENT ASTHMA WITH EXACERBATION: Primary | ICD-10-CM

## 2022-10-13 RX ORDER — AZITHROMYCIN 250 MG/1
250 TABLET, FILM COATED ORAL SEE ADMIN INSTRUCTIONS
Qty: 6 TABLET | Refills: 0 | Status: SHIPPED | OUTPATIENT
Start: 2022-10-13 | End: 2022-10-18

## 2022-10-13 RX ORDER — PREDNISONE 10 MG/1
TABLET ORAL
Qty: 30 TABLET | Refills: 0 | Status: SHIPPED | OUTPATIENT
Start: 2022-10-13 | End: 2022-10-23

## 2022-10-20 NOTE — TELEPHONE ENCOUNTER
Received refill request for Albuterol. Medication was last ordered by cary. Medication was last ordered on 5/23/22 with 1 refills. Patient was last seen in the office 10/4/22. Patient has a scheduled follow up 10/9/23. Medication needs to be sent to [address] [place] [address] -  [phone] Pharmacy.

## 2022-10-31 ENCOUNTER — TELEPHONE (OUTPATIENT)
Dept: PULMONOLOGY | Age: 37
End: 2022-10-31

## 2022-10-31 NOTE — TELEPHONE ENCOUNTER
RA Ada needs permission to give patient generic Proair, brand name Proair isnt made anymore. Please call RA Ada back with response.

## 2022-11-01 NOTE — TELEPHONE ENCOUNTER
Patient is calling to check on this request. They are having trouble getting the proair in from their supplier. They are wanting to switch it to an alternative and she is currently out of her inhaler.     Elia Gil #[id number] - [place], [facility] -  [phone]

## 2022-12-06 DIAGNOSIS — J45.40 MODERATE PERSISTENT ASTHMA WITHOUT COMPLICATION: ICD-10-CM

## 2022-12-06 RX ORDER — FLUTICASONE PROPIONATE 110 UG/1
AEROSOL, METERED RESPIRATORY (INHALATION)
Qty: 12 G | Refills: 9 | Status: SHIPPED | OUTPATIENT
Start: 2022-12-06

## 2022-12-06 NOTE — TELEPHONE ENCOUNTER
Received refill request for Flovent. Medication was last ordered by Marie. Medication was last ordered on 10/4/22 with 2 refills. Patient was last seen in the office 10/4/22. Does patient have a scheduled follow up?: yes - 10/9/23    Medication needs to be sent to [address] [place] [address] [phone]. Thank you, please advise! Patient's Allergies:   Allergies   Allergen Reactions    Clindamycin/Lincomycin Hives

## 2023-05-31 DIAGNOSIS — J45.40 MODERATE PERSISTENT ASTHMA WITHOUT COMPLICATION: ICD-10-CM

## 2023-05-31 RX ORDER — MONTELUKAST SODIUM 10 MG/1
10 TABLET ORAL NIGHTLY
Qty: 30 TABLET | Refills: 11 | Status: SHIPPED | OUTPATIENT
Start: 2023-05-31

## 2023-05-31 NOTE — TELEPHONE ENCOUNTER
Received refill request for montelukast (Singulair). Medication was last ordered by Ravinder Ware. Medication was last ordered on 5/23/22 with 11 refills. Patient was last seen in the office 10/4/22. Does patient have a scheduled follow up?: yes - 10/9/23. Medication needs to be sent to [address] [place] [address] -  [phone]. Thank you, please advise! Patient's Allergies:   Allergies   Allergen Reactions    Clindamycin/Lincomycin Hives

## 2023-10-09 ENCOUNTER — OFFICE VISIT (OUTPATIENT)
Dept: PULMONOLOGY | Age: 38
End: 2023-10-09
Payer: COMMERCIAL

## 2023-10-09 VITALS
WEIGHT: 285 LBS | OXYGEN SATURATION: 97 % | TEMPERATURE: 96.9 F | HEIGHT: 64 IN | DIASTOLIC BLOOD PRESSURE: 74 MMHG | HEART RATE: 82 BPM | SYSTOLIC BLOOD PRESSURE: 126 MMHG | BODY MASS INDEX: 48.65 KG/M2

## 2023-10-09 DIAGNOSIS — Z91.09 ENVIRONMENTAL ALLERGIES: ICD-10-CM

## 2023-10-09 DIAGNOSIS — Z86.16 HISTORY OF 2019 NOVEL CORONAVIRUS DISEASE (COVID-19): ICD-10-CM

## 2023-10-09 DIAGNOSIS — E66.01 MORBID OBESITY WITH BMI OF 50.0-59.9, ADULT (HCC): ICD-10-CM

## 2023-10-09 DIAGNOSIS — J45.40 MODERATE PERSISTENT ASTHMA WITHOUT COMPLICATION: Primary | ICD-10-CM

## 2023-10-09 PROCEDURE — 1036F TOBACCO NON-USER: CPT | Performed by: NURSE PRACTITIONER

## 2023-10-09 PROCEDURE — 99214 OFFICE O/P EST MOD 30 MIN: CPT | Performed by: NURSE PRACTITIONER

## 2023-10-09 PROCEDURE — G8427 DOCREV CUR MEDS BY ELIG CLIN: HCPCS | Performed by: NURSE PRACTITIONER

## 2023-10-09 PROCEDURE — G8417 CALC BMI ABV UP PARAM F/U: HCPCS | Performed by: NURSE PRACTITIONER

## 2023-10-09 PROCEDURE — G8484 FLU IMMUNIZE NO ADMIN: HCPCS | Performed by: NURSE PRACTITIONER

## 2023-10-09 RX ORDER — BUDESONIDE AND FORMOTEROL FUMARATE DIHYDRATE 160; 4.5 UG/1; UG/1
2 AEROSOL RESPIRATORY (INHALATION) 2 TIMES DAILY
COMMUNITY
Start: 2023-09-11

## 2023-10-09 RX ORDER — MONTELUKAST SODIUM 10 MG/1
10 TABLET ORAL NIGHTLY
Qty: 30 TABLET | Refills: 11 | Status: SHIPPED | OUTPATIENT
Start: 2023-10-09

## 2023-10-09 RX ORDER — LEVONORGESTREL AND ETHINYL ESTRADIOL AND ETHINYL ESTRADIOL 150-30(84)
1 KIT ORAL DAILY
COMMUNITY
Start: 2023-08-21

## 2023-10-09 RX ORDER — LEVOTHYROXINE SODIUM 0.03 MG/1
25 TABLET ORAL DAILY
COMMUNITY
Start: 2023-08-04

## 2023-10-09 RX ORDER — SEMAGLUTIDE 1.34 MG/ML
INJECTION, SOLUTION SUBCUTANEOUS
COMMUNITY
Start: 2023-09-11

## 2023-10-09 ASSESSMENT — ENCOUNTER SYMPTOMS
DIARRHEA: 0
VOMITING: 0
WHEEZING: 0
NAUSEA: 0
SHORTNESS OF BREATH: 0
EYES NEGATIVE: 1
COUGH: 1
ABDOMINAL PAIN: 0

## 2023-11-27 RX ORDER — ALBUTEROL SULFATE 90 UG/1
2 AEROSOL, METERED RESPIRATORY (INHALATION) EVERY 6 HOURS PRN
Qty: 25.5 G | Refills: 5 | Status: SHIPPED | OUTPATIENT
Start: 2023-11-27 | End: 2024-05-25

## 2024-07-08 DIAGNOSIS — J45.40 MODERATE PERSISTENT ASTHMA WITHOUT COMPLICATION: ICD-10-CM

## 2024-07-08 RX ORDER — MONTELUKAST SODIUM 10 MG/1
10 TABLET ORAL NIGHTLY
Qty: 30 TABLET | Refills: 11 | Status: SHIPPED | OUTPATIENT
Start: 2024-07-08

## 2024-07-08 NOTE — TELEPHONE ENCOUNTER
Received refill request for Montelukast Sodium. Medication was last ordered by Marie Erazo. Medication was last ordered on 10/9/23 with 11 refills.   Patient was last seen in the office 10/9/23. Patient has a scheduled follow up 10/7/24.   Medication needs to be sent to [facility].

## 2024-10-07 ENCOUNTER — TELEPHONE (OUTPATIENT)
Dept: PULMONOLOGY | Age: 39
End: 2024-10-07

## 2024-10-07 DIAGNOSIS — J45.40 MODERATE PERSISTENT ASTHMA WITHOUT COMPLICATION: ICD-10-CM

## 2024-10-07 RX ORDER — MONTELUKAST SODIUM 10 MG/1
10 TABLET ORAL NIGHTLY
Qty: 30 TABLET | Refills: 1 | Status: SHIPPED | OUTPATIENT
Start: 2024-10-07 | End: 2024-10-08 | Stop reason: SDUPTHER

## 2024-10-07 NOTE — TELEPHONE ENCOUNTER
Patient called stating she forgot about her appt today and she didn't get her pft done due to insurance change. She is asking if your still wanting her to get the pft done. Her next appt with you in scheduled for dec 2nd and she is asking if you could refill her Singulair? Also can I move her to one of your sleep days to get in sooner?

## 2024-10-08 ENCOUNTER — TELEPHONE (OUTPATIENT)
Dept: PULMONOLOGY | Age: 39
End: 2024-10-08

## 2024-10-08 ENCOUNTER — HOSPITAL ENCOUNTER (OUTPATIENT)
Dept: GENERAL RADIOLOGY | Age: 39
Discharge: HOME | End: 2024-10-08

## 2024-10-08 ENCOUNTER — OFFICE VISIT (OUTPATIENT)
Dept: PULMONOLOGY | Age: 39
End: 2024-10-08
Payer: COMMERCIAL

## 2024-10-08 VITALS
BODY MASS INDEX: 47.29 KG/M2 | OXYGEN SATURATION: 97 % | HEART RATE: 76 BPM | HEIGHT: 64 IN | WEIGHT: 277 LBS | TEMPERATURE: 97.9 F | SYSTOLIC BLOOD PRESSURE: 130 MMHG | DIASTOLIC BLOOD PRESSURE: 82 MMHG

## 2024-10-08 DIAGNOSIS — Z91.09 ENVIRONMENTAL ALLERGIES: ICD-10-CM

## 2024-10-08 DIAGNOSIS — J45.40 MODERATE PERSISTENT ASTHMA WITHOUT COMPLICATION: Primary | ICD-10-CM

## 2024-10-08 DIAGNOSIS — Z00.6 EXAMINATION FOR NORMAL COMPARISON FOR CLINICAL RESEARCH: ICD-10-CM

## 2024-10-08 PROCEDURE — 99214 OFFICE O/P EST MOD 30 MIN: CPT | Performed by: NURSE PRACTITIONER

## 2024-10-08 RX ORDER — FLUTICASONE FUROATE, UMECLIDINIUM BROMIDE AND VILANTEROL TRIFENATATE 200; 62.5; 25 UG/1; UG/1; UG/1
1 POWDER RESPIRATORY (INHALATION) DAILY
Qty: 3 EACH | Refills: 3 | Status: SHIPPED | OUTPATIENT
Start: 2024-10-08 | End: 2025-10-08

## 2024-10-08 RX ORDER — ALBUTEROL SULFATE AND BUDESONIDE 90; 80 UG/1; UG/1
2 AEROSOL, METERED RESPIRATORY (INHALATION) EVERY 4 HOURS PRN
Qty: 10.7 G | Refills: 5 | Status: SHIPPED | OUTPATIENT
Start: 2024-10-08

## 2024-10-08 RX ORDER — MONTELUKAST SODIUM 10 MG/1
10 TABLET ORAL NIGHTLY
Qty: 90 TABLET | Refills: 3 | Status: SHIPPED | OUTPATIENT
Start: 2024-10-08

## 2024-10-08 ASSESSMENT — ENCOUNTER SYMPTOMS
CHEST TIGHTNESS: 1
COUGH: 1
WHEEZING: 1

## 2024-10-08 NOTE — TELEPHONE ENCOUNTER
Received records from [facility] and scanned to patient's chart.  Called to push images from [facility] to [facility].     Please advise, thank you!

## 2024-10-08 NOTE — TELEPHONE ENCOUNTER
Called [facility] records for patient records from April 2024 for an Asthma flare up but spoke with carol and she said there was no records at all for 2024.

## 2024-10-08 NOTE — TELEPHONE ENCOUNTER
Called [facility] medical records for records on an ER visit from August 2024. Spoke with Marly at medical records and currently awaiting records via fax.

## 2024-10-08 NOTE — PROGRESS NOTES
persistent asthma without complication- suboptimally controlled  Needs to get back on maintenance inhaler  Start fluticasone-umeclidin-vilant (TRELEGY ELLIPTA) 200-62.5-25 MCG/ACT AEPB inhaler; Inhale 1 puff into the lungs daily Rinse mouth with water, gargle, and spit after use.sent home with one sample and coupon   Continue  montelukast (SINGULAIR) 10 MG tablet; Take 1 tablet by mouth nightly  Start Albuterol-Budesonide (AIRSUPRA) 90-80 MCG/ACT AERO; Inhale 2 puffs into the lungs every 4 hours as needed (wheezing/ shortness of breath)- sent with coupon   Discontinue albuterol sulfate HFA   Schedule  Spirometry With Bronchodilator; Future before next visit     Environmental allergies- controlled  Continue singulair  Continue PRN oral antihistamine     (Please note that portions of this note may have been with a voice recognition program.  Efforts were made to edit the dictation but occasionally words are mis-transcribed )     Will see Marissa Campa in: 3 months with spirometry   billing based on medical decision making   Electronically signed by YANG Vail CNP on 10/8/2024 at 8:49 AM   
citalopram 20 mg oral tablet: 1 tab(s) orally once a day  Drisdol 50,000 intl units (1.25 mg) oral capsule: 1 cap(s) orally once a week  folic acid 0.4 mg oral tablet: 1 tab(s) orally once a day   pantoprazole 40 mg oral delayed release tablet: 1 tab(s) orally once a day (before a meal)  thiamine 100 mg oral tablet: 1 tab(s) orally once a day

## 2025-01-20 ENCOUNTER — OFFICE VISIT (OUTPATIENT)
Dept: PULMONOLOGY | Age: 40
End: 2025-01-20
Payer: COMMERCIAL

## 2025-01-20 VITALS
BODY MASS INDEX: 50.72 KG/M2 | TEMPERATURE: 98 F | HEIGHT: 62 IN | DIASTOLIC BLOOD PRESSURE: 88 MMHG | SYSTOLIC BLOOD PRESSURE: 136 MMHG | OXYGEN SATURATION: 97 % | HEART RATE: 82 BPM | WEIGHT: 275.6 LBS

## 2025-01-20 DIAGNOSIS — E66.01 MORBID OBESITY WITH BMI OF 50.0-59.9, ADULT: ICD-10-CM

## 2025-01-20 DIAGNOSIS — J00 ACUTE NASOPHARYNGITIS: ICD-10-CM

## 2025-01-20 DIAGNOSIS — J45.40 MODERATE PERSISTENT ASTHMA WITHOUT COMPLICATION: Primary | ICD-10-CM

## 2025-01-20 PROCEDURE — 99214 OFFICE O/P EST MOD 30 MIN: CPT | Performed by: NURSE PRACTITIONER

## 2025-01-20 PROCEDURE — 1036F TOBACCO NON-USER: CPT | Performed by: NURSE PRACTITIONER

## 2025-01-20 PROCEDURE — G8427 DOCREV CUR MEDS BY ELIG CLIN: HCPCS | Performed by: NURSE PRACTITIONER

## 2025-01-20 PROCEDURE — G8417 CALC BMI ABV UP PARAM F/U: HCPCS | Performed by: NURSE PRACTITIONER

## 2025-01-20 RX ORDER — FLUTICASONE FUROATE AND VILANTEROL 200; 25 UG/1; UG/1
1 POWDER RESPIRATORY (INHALATION)
Qty: 30 EACH | Refills: 11 | Status: CANCELLED | OUTPATIENT
Start: 2025-01-20

## 2025-01-20 RX ORDER — FLUTICASONE FUROATE, UMECLIDINIUM BROMIDE AND VILANTEROL TRIFENATATE 200; 62.5; 25 UG/1; UG/1; UG/1
1 POWDER RESPIRATORY (INHALATION) DAILY
Qty: 1 EACH | Refills: 11 | Status: SHIPPED | OUTPATIENT
Start: 2025-01-20 | End: 2026-01-20

## 2025-01-20 RX ORDER — ALBUTEROL SULFATE AND BUDESONIDE 90; 80 UG/1; UG/1
2 AEROSOL, METERED RESPIRATORY (INHALATION) EVERY 4 HOURS PRN
Qty: 10.7 G | Refills: 5 | Status: SHIPPED | OUTPATIENT
Start: 2025-01-20

## 2025-01-20 ASSESSMENT — ENCOUNTER SYMPTOMS
COUGH: 1
WHEEZING: 1

## 2025-01-20 NOTE — PROGRESS NOTES
[facility]     Patient: DARRYL LICONA, 39 y.o.   : 1985  2025    Pt of Dr. Calero     Subjective     Chief Complaint   Patient presents with    Follow-up     3 month asthma follow up, patient has been sick & cancelled PFT.          HPI       Patient presents for 3 months Asthma   medication follow up   Has had URI symptoms the last couple weeks, has appt with PCP tomorrow     Was scheduled for PFT patient canceled due to being sick  Current inhaler: Trelegy ellipta 200 mcg , feels therapy is effective, however having problems with insurance coverage.   Has felt much better since being on trelegy   Using rescue about 1 x per week   Has found airsupra helpful     Last spirometry: - normal spirometry, + air trapping   Previous inhalers: Flovent, Symbicort 160/4.5 mcg  Taking Singulair 10 mg PO nightly , Zyrtec PO PRN   Rarely uses neb machine  on Ozempic from primary care to aid in wt loss , currently down 20 lbs and is feeling better    Observed precipitants include dust, pollens, and mold  .    Current limitations in activity from asthma: none.        Other therapies: fluticasone nasal spray (Flonase, Sensimist), PRN       Darryl  has not been to the ER since their last office visit.  Lung function: Fev1 >60% Predicted  -> Yes  Number of exacerbations per year: > 2 -> No     Immunization History   Administered Date(s) Administered    COVID-19, [employer] BLUE [employer], Primary or Immunocompromised, (age 12y+), IM, 100 mcg/0.5mL 2021, 2021       SOCIAL HISTORY:  Social History     Tobacco Use    Smoking status: Never    Smokeless tobacco: Never   Substance Use Topics    Alcohol use: Yes     Comment: social    Drug use: No       CURRENT MEDICATIONS:  Current Outpatient Medications   Medication Sig Dispense Refill    fluticasone-umeclidin-vilant (TRELEGY ELLIPTA) 200-62.5-25 MCG/ACT AEPB inhaler Inhale 1 puff into the lungs daily Rinse mouth with

## 2025-07-14 ENCOUNTER — OFFICE VISIT (OUTPATIENT)
Dept: PULMONOLOGY | Age: 40
End: 2025-07-14
Payer: COMMERCIAL

## 2025-07-14 VITALS
WEIGHT: 277.2 LBS | DIASTOLIC BLOOD PRESSURE: 68 MMHG | SYSTOLIC BLOOD PRESSURE: 122 MMHG | OXYGEN SATURATION: 99 % | HEART RATE: 77 BPM | HEIGHT: 62 IN | TEMPERATURE: 97.2 F | BODY MASS INDEX: 51.01 KG/M2

## 2025-07-14 DIAGNOSIS — J45.40 MODERATE PERSISTENT ASTHMA WITHOUT COMPLICATION: Primary | ICD-10-CM

## 2025-07-14 PROCEDURE — 99213 OFFICE O/P EST LOW 20 MIN: CPT | Performed by: NURSE PRACTITIONER

## 2025-07-14 RX ORDER — MONTELUKAST SODIUM 10 MG/1
10 TABLET ORAL NIGHTLY
Qty: 90 TABLET | Refills: 3 | Status: SHIPPED | OUTPATIENT
Start: 2025-07-14

## 2025-07-14 RX ORDER — UBROGEPANT 100 MG/1
TABLET ORAL
COMMUNITY
Start: 2025-07-13

## 2025-07-14 RX ORDER — FLUTICASONE FUROATE, UMECLIDINIUM BROMIDE AND VILANTEROL TRIFENATATE 200; 62.5; 25 UG/1; UG/1; UG/1
1 POWDER RESPIRATORY (INHALATION) DAILY
Qty: 3 EACH | Refills: 3 | Status: SHIPPED | OUTPATIENT
Start: 2025-07-14 | End: 2026-07-14

## 2025-07-14 RX ORDER — ALBUTEROL SULFATE AND BUDESONIDE 90; 80 UG/1; UG/1
2 AEROSOL, METERED RESPIRATORY (INHALATION) EVERY 4 HOURS PRN
Qty: 10.7 G | Refills: 5 | Status: SHIPPED | OUTPATIENT
Start: 2025-07-14

## 2025-07-14 NOTE — PROGRESS NOTES
[facility]     Patient: DARRYL LICONA, 40 y.o.   : 1985  2025    Pt of Dr. Calero     Subjective     Chief Complaint   Patient presents with    Follow-up     6 month asthma follow up , no testing        History of Present Illness  The patient is a 40-year-old female who presents for a 1-year asthma follow-up.    Asthma Management  She reports that her asthma has been well-managed. She recently changed jobs and acquired new insurance, which now fully covers her Trelegy medication. She believes this treatment is effective. She has been using Airsupra as a rescue inhaler, rarely uses but its use has increased slightly in the past few weeks due to high humidity. She does not require it daily.   Her daily activities and work do not exacerbate her condition.   She has not needed oral steroids or nebulizer treatments.   She continues to take montelukast at night. About a month ago, she used over-the-counter Zyrtec for a few days, but it is not part of her regular regimen.    Mild Cough  She occasionally experiences a mild dry  cough, which she attributes to nearby construction causing dust exposure. She has been changing the air filters in her air conditioning unit every 2 weeks.    She has not had a spirometry test recently as she had to cancel the last one due to illness.      FAMILY HISTORY  - Son has asthma       Immunization History   Administered Date(s) Administered    [facility], [facility], Primary or Immunocompromised, (age 12y+), IM, 100 mcg/0.5mL 2021, 2021       SOCIAL HISTORY:  Social History     Tobacco Use    Smoking status: Never    Smokeless tobacco: Never   Substance Use Topics    Alcohol use: Yes     Comment: social    Drug use: No       CURRENT MEDICATIONS:  Current Outpatient Medications   Medication Sig Dispense Refill    UBRELVY 100 MG TABS       fluticasone-umeclidin-vilant (TRELEGY ELLIPTA) 200-62.5-25 MCG/ACT AEPB

## 2025-07-14 NOTE — PROGRESS NOTES
[facility]     Patient: Lukasz Tello, 41 y.o.   : 1985  10/9/2023    Pt of Dr. Luciana Nevarez   Patient presents with    Follow-up     1 yr asthma no testing. HPI  Asthma control (Severity) questionnaire:  Marissa presents for asthma follow-up. She states symptoms currently include wheezing and occur  intermittently, noticing more symptoms since cooler weather . Night time awakenings: > 2 times per month -> No  Was taking Flovent compliantly, states her family members and PCP were noticing wheezing and heavy breathing, switched to Symbicort 160/4.5 mcg 2 puffs BID, she has noticed benefit. Using GREG less often and feels Less SOB   Currently using albuterol 3-6 times per day , \" just getting over a sinus infection\"   Was not on atb / steroids   Taking Singulair 10 mg PO nightly , Zyrtec PO PRN   Rarely uses neb machine  Now on Ozempic from primary care to aid in wt loss    Observed precipitants include dust, pollens, and mold  . Current limitations in activity from asthma: none. Other therapies: fluticasone nasal spray (Flonase, Sensimist), PRN      Marissa  has not been to the ER since their last office visit.   Lung function: Fev1 >60% Predicted  -> Yes  Number of exacerbations per year: > 2 -> No      Vaccines:  Influenza due for , Pneumonia yes, , COVID No- needs booster   Any history of COVID 19 infection: yes - 2022- mild symptoms - took Paxlovid     SOCIAL HISTORY:  Social History     Tobacco Use    Smoking status: Never    Smokeless tobacco: Never   Substance Use Topics    Alcohol use: No     Alcohol/week: 0.0 standard drinks of alcohol    Drug use: No       CURRENT MEDICATIONS:  Current Outpatient Medications   Medication Sig Dispense Refill    Semaglutide, 1 MG/DOSE, (OZEMPIC, 1 MG/DOSE,) 4 MG/3ML SOPN       montelukast (SINGULAIR) 10 MG tablet Take 1 tablet by mouth nightly 30 tablet 11 Leukocytosis Non-small cell carcinoma of lung, stage 4